# Patient Record
Sex: FEMALE | Race: BLACK OR AFRICAN AMERICAN | NOT HISPANIC OR LATINO | URBAN - METROPOLITAN AREA
[De-identification: names, ages, dates, MRNs, and addresses within clinical notes are randomized per-mention and may not be internally consistent; named-entity substitution may affect disease eponyms.]

---

## 2022-01-28 ENCOUNTER — OUTPATIENT (OUTPATIENT)
Dept: OUTPATIENT SERVICES | Facility: HOSPITAL | Age: 37
LOS: 1 days | End: 2022-01-28
Payer: COMMERCIAL

## 2022-01-28 VITALS
TEMPERATURE: 98 F | RESPIRATION RATE: 14 BRPM | SYSTOLIC BLOOD PRESSURE: 127 MMHG | OXYGEN SATURATION: 100 % | WEIGHT: 169.98 LBS | DIASTOLIC BLOOD PRESSURE: 82 MMHG | HEIGHT: 67.5 IN | HEART RATE: 91 BPM

## 2022-01-28 DIAGNOSIS — Z01.818 ENCOUNTER FOR OTHER PREPROCEDURAL EXAMINATION: ICD-10-CM

## 2022-01-28 DIAGNOSIS — N87.1 MODERATE CERVICAL DYSPLASIA: ICD-10-CM

## 2022-01-28 LAB — HCG UR QL: NEGATIVE — SIGNIFICANT CHANGE UP

## 2022-01-28 PROCEDURE — 81025 URINE PREGNANCY TEST: CPT

## 2022-01-28 PROCEDURE — 88321 CONSLTJ&REPRT SLD PREP ELSWR: CPT

## 2022-01-28 PROCEDURE — G0463: CPT

## 2022-01-28 NOTE — H&P PST ADULT - NEUROLOGICAL COMMENTS
DX with Multiple Sclerosis October 2015 - notes mobility issues left leg decreased mobility and "slight"  drop foot left foot

## 2022-01-28 NOTE — H&P PST ADULT - HISTORY OF PRESENT ILLNESS
36 year old female  PMH Multiple Sclerosis, Moderate Cervical Dysplasia; presents today for PST prior to LEEP procedure - Endocervical Curettage with Dr Escobar on 2022. pt notes recent Pap Smear showed abnormal findings.  pt notes she underwent Colposcopy in the office and now was advised to have LEEP procedure. Following discussions with Dr Escobar pt is electing for scheduled procedure.

## 2022-01-28 NOTE — H&P PST ADULT - ATTENDING COMMENTS
patient presents for leep/ecc for cin2 on colpo. explained risks of surgery including but not limited to hemorrhage, infection, and damage to nearby structures like vaginal mucosa.  patient understands risks and agrees with plan of care, consent obtained and in chart

## 2022-01-28 NOTE — H&P PST ADULT - FALL HARM RISK - FALL HARM RISK
pt does note mobility issues secondary to her Multiple Sclerosis- does NOT use a cane or walker for ambulation assistance/No indicators present

## 2022-01-28 NOTE — H&P PST ADULT - NS SC CAGE ALCOHOL CUT DOWN
[FreeTextEntry1] : 48 year old female, w/ a PMHx of CAD (s/p NSTEMI 5/2020, PCI pmLAD in 5/2020, pmRCA 6/2020 on ASA/Brillinta), and HLD, presenting for follow up visit. Pt reports that she is back to her usual state of health. Has lost >10lbs through diet and exercise. The discomfort she had going up a hill after the PCIs is now resolved. Has noticed easy bruising while on Brillinta but not enough to concern her. Denies chest pain, sob, palpations, diaphoresis or weakness today.
no

## 2022-01-28 NOTE — H&P PST ADULT - NSICDXFAMILYHX_GEN_ALL_CORE_FT
FAMILY HISTORY:  Father  Still living? No  Family history of diabetes mellitus, Age at diagnosis: Age Unknown  Family history of heart attack, Age at diagnosis: Age Unknown    Mother  Still living? Yes, Estimated age: 71-80  Family history of hypertension, Age at diagnosis: Age Unknown

## 2022-01-28 NOTE — H&P PST ADULT - NEGATIVE ENMT SYMPTOMS
No no hearing difficulty/no sinus symptoms/no nasal congestion/no abnormal taste sensation/no throat pain/no dysphagia

## 2022-01-28 NOTE — H&P PST ADULT - NEGATIVE GENERAL SYMPTOMS
Please advise
Referral is in Epic already-- Fax referral to [facility] [place]
Denies prior H/O COVID - Has had 2 doses of Pfizer vaccine - plans on getting booster in April 2022/no fever/no chills

## 2022-01-28 NOTE — H&P PST ADULT - MUSCULOSKELETAL
negative Decreased ROM and strength LLE secondary to Multiple Sclerosis/ROM intact/no calf tenderness/decreased ROM detailed exam

## 2022-01-28 NOTE — H&P PST ADULT - ASSESSMENT
36 year old female  PMH Multiple Sclerosis, Moderate Cervical Dysplasia; scheduled for LEEP procedure - Endocervical Curettage with Dr Escobar on 2022.

## 2022-01-28 NOTE — H&P PST ADULT - NSICDXPASTMEDICALHX_GEN_ALL_CORE_FT
PAST MEDICAL HISTORY:  H/O multiple sclerosis DX October 2015- Notes Mobility issues - left lower extremity weakness slight left drop foot    Moderate cervical dysplasia

## 2022-02-03 LAB — SURGICAL PATHOLOGY STUDY: SIGNIFICANT CHANGE UP

## 2022-02-04 RX ORDER — ONDANSETRON 8 MG/1
4 TABLET, FILM COATED ORAL ONCE
Refills: 0 | Status: DISCONTINUED | OUTPATIENT
Start: 2022-02-07 | End: 2022-02-08

## 2022-02-04 RX ORDER — SODIUM CHLORIDE 9 MG/ML
1000 INJECTION, SOLUTION INTRAVENOUS
Refills: 0 | Status: DISCONTINUED | OUTPATIENT
Start: 2022-02-07 | End: 2022-02-08

## 2022-02-04 RX ORDER — HYDROMORPHONE HYDROCHLORIDE 2 MG/ML
0.5 INJECTION INTRAMUSCULAR; INTRAVENOUS; SUBCUTANEOUS ONCE
Refills: 0 | Status: DISCONTINUED | OUTPATIENT
Start: 2022-02-07 | End: 2022-02-08

## 2022-02-04 NOTE — ASU PATIENT PROFILE, ADULT - FALL HARM RISK - UNIVERSAL INTERVENTIONS
Bed in lowest position, wheels locked, appropriate side rails in place/Call bell, personal items and telephone in reach/Instruct patient to call for assistance before getting out of bed or chair/Non-slip footwear when patient is out of bed/[place]g to call system/Physically safe environment - no spills, clutter or unnecessary equipment/Purposeful Proactive Rounding/Room/bathroom lighting operational, light cord in reach

## 2022-02-07 ENCOUNTER — OUTPATIENT (OUTPATIENT)
Dept: OUTPATIENT SERVICES | Facility: HOSPITAL | Age: 37
LOS: 1 days | End: 2022-02-07
Payer: COMMERCIAL

## 2022-02-07 VITALS — OXYGEN SATURATION: 100 % | RESPIRATION RATE: 17 BRPM | HEART RATE: 75 BPM

## 2022-02-07 VITALS
SYSTOLIC BLOOD PRESSURE: 137 MMHG | DIASTOLIC BLOOD PRESSURE: 82 MMHG | TEMPERATURE: 97 F | WEIGHT: 169.98 LBS | OXYGEN SATURATION: 100 % | RESPIRATION RATE: 15 BRPM | HEART RATE: 90 BPM | HEIGHT: 67.5 IN

## 2022-02-07 DIAGNOSIS — N87.1 MODERATE CERVICAL DYSPLASIA: ICD-10-CM

## 2022-02-07 LAB — HCG UR QL: NEGATIVE — SIGNIFICANT CHANGE UP

## 2022-02-07 PROCEDURE — 88305 TISSUE EXAM BY PATHOLOGIST: CPT

## 2022-02-07 PROCEDURE — 57522 CONIZATION OF CERVIX: CPT

## 2022-02-07 PROCEDURE — 81025 URINE PREGNANCY TEST: CPT

## 2022-02-07 PROCEDURE — 88307 TISSUE EXAM BY PATHOLOGIST: CPT

## 2022-02-07 PROCEDURE — 88305 TISSUE EXAM BY PATHOLOGIST: CPT | Mod: 26

## 2022-02-07 PROCEDURE — 88307 TISSUE EXAM BY PATHOLOGIST: CPT | Mod: 26

## 2022-02-07 RX ORDER — DIMETHYL FUMARATE 240 MG/1
1 CAPSULE ORAL
Qty: 0 | Refills: 0 | DISCHARGE

## 2022-02-07 RX ORDER — CHOLECALCIFEROL (VITAMIN D3) 125 MCG
10000 CAPSULE ORAL
Qty: 0 | Refills: 0 | DISCHARGE

## 2022-02-07 RX ORDER — SODIUM CHLORIDE 9 MG/ML
1000 INJECTION, SOLUTION INTRAVENOUS
Refills: 0 | Status: DISCONTINUED | OUTPATIENT
Start: 2022-02-07 | End: 2022-02-07

## 2022-02-07 RX ADMIN — SODIUM CHLORIDE 75 MILLILITER(S): 9 INJECTION, SOLUTION INTRAVENOUS at 12:28

## 2022-02-07 RX ADMIN — SODIUM CHLORIDE 75 MILLILITER(S): 9 INJECTION, SOLUTION INTRAVENOUS at 08:14

## 2022-02-07 NOTE — ASU DISCHARGE PLAN (ADULT/PEDIATRIC) - CARE PROVIDER_API CALL
Faith Escobar)  Obstetrics and Gynecology  [address] [place] [address]  Phone: [phone]  Fax: [phone]  Follow Up Time:

## 2022-02-07 NOTE — BRIEF OPERATIVE NOTE - NSICDXBRIEFPROCEDURE_GEN_ALL_CORE_FT
PROCEDURES:  Biopsy, cervix, with LEEP 07-Feb-2022 10:28:06  Faith Escobar  Curettage, endocervical 07-Feb-2022 10:31:07  Faith Escobar E

## 2022-02-07 NOTE — BRIEF OPERATIVE NOTE - OPERATION/FINDINGS
hypopigmentation after lugos iodine placement at 12 o clock and 6 o clock positions, 21w16is loop used for excision, roller ball coag for hemostasis

## 2024-08-01 NOTE — H&P PST ADULT - BSA (M2)
1.9 continue synthroid 50mcg qd continue synthroid 50mcg qd continue synthroid 50mcg qd -No acute interventions needed  -Murmur heard on exam consistent continue synthroid 50mcg qd continue synthroid 50mcg qd -Synthroid continue synthroid 50mcg qd continue synthroid 50mcg qd continue synthroid 50mcg qd

## 2025-05-17 NOTE — ASU PREOP CHECKLIST - ANTIBIOTIC
ED provider notified of vital signs and reassessment.  Patient remains appropriate for admission to Med Surg.   
no (get order)

## (undated) DEVICE — ELCTR LOOP FOR LLETZ 20MM X 15MM

## (undated) DEVICE — DRAPE TOWEL BLUE 17" X 24"

## (undated) DEVICE — ELCTR LOOP FOR LLETZ 20MM X 12MM

## (undated) DEVICE — NDL HYPO SAFE 22G X 1.5"

## (undated) DEVICE — TUBING SUCTION 20FT

## (undated) DEVICE — BLANKET WARMER FULL ADULT

## (undated) DEVICE — DRAPE LIGHT HANDLE COVER FLEX GREEN

## (undated) DEVICE — Q TIP 6"

## (undated) DEVICE — ELCTR LOOP FOR LLETZ 10MM

## (undated) DEVICE — GLV 7.5 ESTEEM BLUE

## (undated) DEVICE — NEEDLE COUNTER FOAM AND MAGNET 20-40

## (undated) DEVICE — PSP-SCD MACHINE: Type: DURABLE MEDICAL EQUIPMENT

## (undated) DEVICE — SYR LUER LOK 10CC

## (undated) DEVICE — PREP KIT SKIN PREP DRY

## (undated) DEVICE — ELCTR PENCIL SMOKE EVACUATION

## (undated) DEVICE — SOL IRR POUR NS 0.9% 1000ML

## (undated) DEVICE — PROCTOSCOPIC SWABSTICK 16"

## (undated) DEVICE — GLV 7.5 PROTEXIS

## (undated) DEVICE — PACK LITHOTOMY

## (undated) DEVICE — SOL IRR POUR H2O 1000ML

## (undated) DEVICE — ELCTR BALL LLETZ LG 5MM

## (undated) DEVICE — CATH URETHRAL RED RUBBER 16FR

## (undated) DEVICE — WRAP COMPRESSION CALF LG

## (undated) DEVICE — SUCTION YANKAUER OPEN TIP NO VENT CURVE

## (undated) DEVICE — WRAP COMPRESSION CALF MED

## (undated) DEVICE — ELCTR GROUNDING PAD ADULT COVIDIEN